# Patient Record
Sex: FEMALE | Race: WHITE
[De-identification: names, ages, dates, MRNs, and addresses within clinical notes are randomized per-mention and may not be internally consistent; named-entity substitution may affect disease eponyms.]

---

## 2020-04-20 ENCOUNTER — HOSPITAL ENCOUNTER (OUTPATIENT)
Dept: HOSPITAL 62 - OD | Age: 65
End: 2020-04-20
Attending: NURSE PRACTITIONER
Payer: COMMERCIAL

## 2020-04-20 DIAGNOSIS — S49.91XA: Primary | ICD-10-CM
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
## 2020-04-20 NOTE — RADIOLOGY REPORT (SQ)
EXAM DESCRIPTION:  SHOULDER RIGHT 2 OR MORE VIEWS



IMAGES COMPLETED DATE/TIME:  4/20/2020 4:36 pm



REASON FOR STUDY:  UNSP INJURY OF RIGHT SHOULDER AND UPPER ARM, INIT ENCNTR



COMPARISON:  None.



NUMBER OF VIEWS:  Three views right shoulder



LIMITATIONS:  None.



FINDINGS:  Comminuted fracture of the right shoulder.  This appears to extend from the medial neck up
 into the lateral and lateral aspect of the humeral head with probable relatively nondisplaced tubero
sity fractures.  Slight inferior subluxation.  Remainder of the scapular girdle is intact.

OTHER: No other significant finding.



IMPRESSION:  Proximal right humerus fracture.  Slight shoulder subluxation.

Call Report tasked to the [facility] team at the time of interpretation.



TECHNICAL DOCUMENTATION:  JOB ID:  [id number]



Reading location - IP/workstation name: [email or web address]